# Patient Record
Sex: MALE | Race: ASIAN | URBAN - METROPOLITAN AREA
[De-identification: names, ages, dates, MRNs, and addresses within clinical notes are randomized per-mention and may not be internally consistent; named-entity substitution may affect disease eponyms.]

---

## 2023-07-13 ENCOUNTER — APPOINTMENT (RX ONLY)
Dept: URBAN - METROPOLITAN AREA CLINIC 86 | Facility: CLINIC | Age: 28
Setting detail: DERMATOLOGY
End: 2023-07-13

## 2023-07-13 VITALS — HEIGHT: 74 IN | WEIGHT: 215 LBS

## 2023-07-13 DIAGNOSIS — L21.8 OTHER SEBORRHEIC DERMATITIS: ICD-10-CM

## 2023-07-13 DIAGNOSIS — L81.4 OTHER MELANIN HYPERPIGMENTATION: ICD-10-CM

## 2023-07-13 DIAGNOSIS — L81.1 CHLOASMA: ICD-10-CM

## 2023-07-13 DIAGNOSIS — Z71.89 OTHER SPECIFIED COUNSELING: ICD-10-CM

## 2023-07-13 DIAGNOSIS — L65.9 NONSCARRING HAIR LOSS, UNSPECIFIED: ICD-10-CM

## 2023-07-13 PROCEDURE — ? PRESCRIPTION

## 2023-07-13 PROCEDURE — 99204 OFFICE O/P NEW MOD 45 MIN: CPT

## 2023-07-13 PROCEDURE — ? TREATMENT REGIMEN

## 2023-07-13 PROCEDURE — ? COUNSELING

## 2023-07-13 RX ORDER — HYDROQUINONE 4 %
CREAM (GRAM) TOPICAL
Qty: 30 | Refills: 1 | Status: ERX | COMMUNITY
Start: 2023-07-13

## 2023-07-13 RX ADMIN — Medication: at 00:00

## 2023-07-13 ASSESSMENT — LOCATION ZONE DERM
LOCATION ZONE: NOSE
LOCATION ZONE: SCALP
LOCATION ZONE: FACE

## 2023-07-13 ASSESSMENT — LOCATION SIMPLE DESCRIPTION DERM
LOCATION SIMPLE: RIGHT CHEEK
LOCATION SIMPLE: ANTERIOR SCALP
LOCATION SIMPLE: INFERIOR FOREHEAD
LOCATION SIMPLE: LEFT SCALP
LOCATION SIMPLE: NOSE
LOCATION SIMPLE: LEFT CHEEK

## 2023-07-13 ASSESSMENT — LOCATION DETAILED DESCRIPTION DERM
LOCATION DETAILED: RIGHT INFERIOR CENTRAL MALAR CHEEK
LOCATION DETAILED: LEFT MEDIAL FRONTAL SCALP
LOCATION DETAILED: NASAL DORSUM
LOCATION DETAILED: MID-FRONTAL SCALP
LOCATION DETAILED: LEFT INFERIOR CENTRAL MALAR CHEEK
LOCATION DETAILED: INFERIOR MID FOREHEAD

## 2023-07-13 NOTE — PROCEDURE: COUNSELING
Detail Level: Detailed
Detail Level: Zone
Patient Specific Counseling (Will Not Stick From Patient To Patient): Patient has combination skin:\\n- Advised moisturizers and washes for combination skin\\n- Advised glycolic acid for exfoliation (serum or pads) \\n- Sunscreen daily\\n- Retinoid nightly once finished using HQ rx

## 2023-07-13 NOTE — PROCEDURE: TREATMENT REGIMEN
Otc Regimen: Strict sunscreen
Initiate Treatment: hydroquinone 4 % topical cream: Apply to affected areas qhs x 2 months, discontinue if irritation occur
Detail Level: Zone
Otc Regimen: Continue Nizoral shampoo
Initiate Treatment: [facility]: 5% niacinamide (bulk), 5% azelaic acid (bulk), 7% minoxidil (bulk), 0.1% finasteride (bulk): Apply to scalp qhs
Otc Regimen: Glycolic B5 Serum LRP
Plan: Advised pt that sunscreen is important to prevent sun damage, however, tanning is common with any exposure to UV A.

## 2023-08-10 ENCOUNTER — APPOINTMENT (RX ONLY)
Dept: URBAN - METROPOLITAN AREA CLINIC 86 | Facility: CLINIC | Age: 28
Setting detail: DERMATOLOGY
End: 2023-08-10

## 2023-08-10 VITALS — HEIGHT: 74 IN | WEIGHT: 215 LBS

## 2023-08-10 DIAGNOSIS — L81.1 CHLOASMA: ICD-10-CM

## 2023-08-10 DIAGNOSIS — L65.9 NONSCARRING HAIR LOSS, UNSPECIFIED: ICD-10-CM

## 2023-08-10 PROCEDURE — ? TREATMENT REGIMEN

## 2023-08-10 PROCEDURE — 99214 OFFICE O/P EST MOD 30 MIN: CPT

## 2023-08-10 PROCEDURE — ? COUNSELING

## 2023-08-10 PROCEDURE — ? PRESCRIPTION

## 2023-08-10 RX ORDER — MINOXIDIL 2.5 MG/1
TABLET ORAL
Qty: 45 | Refills: 5 | Status: ERX | COMMUNITY
Start: 2023-08-10

## 2023-08-10 RX ADMIN — MINOXIDIL: 2.5 TABLET ORAL at 00:00

## 2023-08-10 ASSESSMENT — LOCATION DETAILED DESCRIPTION DERM
LOCATION DETAILED: INFERIOR MID FOREHEAD
LOCATION DETAILED: NASAL DORSUM
LOCATION DETAILED: MID-FRONTAL SCALP

## 2023-08-10 ASSESSMENT — LOCATION ZONE DERM
LOCATION ZONE: FACE
LOCATION ZONE: NOSE
LOCATION ZONE: SCALP

## 2023-08-10 ASSESSMENT — LOCATION SIMPLE DESCRIPTION DERM
LOCATION SIMPLE: NOSE
LOCATION SIMPLE: INFERIOR FOREHEAD
LOCATION SIMPLE: ANTERIOR SCALP

## 2023-08-10 NOTE — PROCEDURE: TREATMENT REGIMEN
Otc Regimen: Strict sunscreen
Modify Regimen: Decrease hydroquinone 4 % topical cream to applying at night twice a week only (pt declined refills, reducing frequency given \"swelling\" with application)
Continue Regimen: Kojic acid & transexemic acid compound apply every night when not using Hydroquinoine.
Detail Level: Zone
Plan: Pt was advised that a shedding phase is normal for future hair growth and it will take an estimated 3 months to start seeing improvement. Patient is planning for hair transplant in December - advised being stable on medical regimen x 6 mo prior to transplant. \\n\\nOral finasteride also discussed. Okay to send 1mg QD if patient decides to add on, but declined for today.\\n\\nDiscussed PRP treatments with patient, quote provided.
Initiate Treatment: minoxidil 2.5 mg tablet: Take 1/4 tab po every day
Otc Regimen: Minoxidil 5% foam or liquid to posterior scalp + Nutrafol + Rosemary oil once a week + Shampoo to comfort
Continue Regimen: [facility]-H[facility]loss-Male-[facility]: 5% niacinamide (bulk), 5% azelaic acid (bulk), 7% minoxidil (bulk), 0.1% finasteride (bulk): Apply to scalp qhs

## 2023-09-06 ENCOUNTER — APPOINTMENT (RX ONLY)
Dept: URBAN - METROPOLITAN AREA CLINIC 84 | Facility: CLINIC | Age: 28
Setting detail: DERMATOLOGY
End: 2023-09-06

## 2023-09-06 DIAGNOSIS — L64.8 OTHER ANDROGENIC ALOPECIA: ICD-10-CM

## 2023-09-06 DIAGNOSIS — L81.4 OTHER MELANIN HYPERPIGMENTATION: ICD-10-CM

## 2023-09-06 PROCEDURE — 99204 OFFICE O/P NEW MOD 45 MIN: CPT

## 2023-09-06 PROCEDURE — ? PRESCRIPTION MEDICATION MANAGEMENT

## 2023-09-06 PROCEDURE — ? PHOTO-DOCUMENTATION

## 2023-09-06 PROCEDURE — ? COUNSELING

## 2023-09-06 PROCEDURE — ? PRESCRIPTION

## 2023-09-06 RX ORDER — MINOXIDIL 10 MG/1
1 TABLET ORAL QD
Qty: 8 | Refills: 11 | Status: ERX | COMMUNITY
Start: 2023-09-06

## 2023-09-06 RX ORDER — FINASTERIDE 1 MG/1
1 TABLET, FILM COATED ORAL QD
Qty: 30 | Refills: 11 | Status: ERX | COMMUNITY
Start: 2023-09-06

## 2023-09-06 RX ORDER — DESONIDE 0.5 MG/G
1 CREAM TOPICAL BID
Qty: 15 | Refills: 4 | Status: ERX | COMMUNITY
Start: 2023-09-06

## 2023-09-06 RX ADMIN — DESONIDE 1: 0.5 CREAM TOPICAL at 00:00

## 2023-09-06 RX ADMIN — FINASTERIDE 1: 1 TABLET, FILM COATED ORAL at 00:00

## 2023-09-06 RX ADMIN — MINOXIDIL 1: 10 TABLET ORAL at 00:00

## 2023-09-06 ASSESSMENT — LOCATION SIMPLE DESCRIPTION DERM
LOCATION SIMPLE: RIGHT FOREHEAD
LOCATION SIMPLE: NOSE
LOCATION SIMPLE: RIGHT SCALP
LOCATION SIMPLE: LEFT SCALP
LOCATION SIMPLE: LEFT FOREHEAD
LOCATION SIMPLE: POSTERIOR SCALP

## 2023-09-06 ASSESSMENT — LOCATION DETAILED DESCRIPTION DERM
LOCATION DETAILED: LEFT FOREHEAD
LOCATION DETAILED: NASAL DORSUM
LOCATION DETAILED: LEFT CENTRAL FRONTAL SCALP
LOCATION DETAILED: RIGHT CENTRAL FRONTAL SCALP
LOCATION DETAILED: RIGHT FOREHEAD
LOCATION DETAILED: POSTERIOR MID-PARIETAL SCALP

## 2023-09-06 ASSESSMENT — LOCATION ZONE DERM
LOCATION ZONE: SCALP
LOCATION ZONE: FACE
LOCATION ZONE: NOSE

## 2023-09-06 NOTE — PROCEDURE: PRESCRIPTION MEDICATION MANAGEMENT
Continue Regimen: Minoxidil 2.5 mg QD
Initiate Treatment: Finasteride 1 mg QD.\\nVivascal supplements as directed
Plan: Patient does not need to use topical therapies at this time
Detail Level: Zone
Render In Strict Bullet Format?: No
Modify Regimen: Hydroquinone cream 4% BID X 3 months on, 3 months off then repeat to forehead only
Initiate Treatment: Desonide cream to the nose BID X 2 weeks on, 1 week off then repeat
Samples Given: Elta MD tinted sunscreen daily

## 2023-09-06 NOTE — HPI: HAIR LOSS
How Did The Hair Loss Occur?: gradual in onset
How Severe Is Your Hair Loss?: moderate
Additional History: He states the hair began to increase in loss , recently after beginning the oral and topical solutions (approximately 7 weeks ago) .

## 2023-09-06 NOTE — HPI: DISCOLORATION
Additional History: Patient has been using HQ to the forehead, he had to D/C to the nose, due to swelling and irritation.